# Patient Record
Sex: FEMALE | Race: BLACK OR AFRICAN AMERICAN | Employment: FULL TIME | URBAN - METROPOLITAN AREA
[De-identification: names, ages, dates, MRNs, and addresses within clinical notes are randomized per-mention and may not be internally consistent; named-entity substitution may affect disease eponyms.]

---

## 2024-02-08 ENCOUNTER — OFFICE VISIT (OUTPATIENT)
Age: 79
End: 2024-02-08

## 2024-02-08 VITALS
BODY MASS INDEX: 26.55 KG/M2 | RESPIRATION RATE: 16 BRPM | WEIGHT: 165.2 LBS | SYSTOLIC BLOOD PRESSURE: 168 MMHG | HEART RATE: 68 BPM | DIASTOLIC BLOOD PRESSURE: 84 MMHG | OXYGEN SATURATION: 98 % | HEIGHT: 66 IN | TEMPERATURE: 97.2 F

## 2024-02-08 DIAGNOSIS — R06.02 SHORTNESS OF BREATH: ICD-10-CM

## 2024-02-08 DIAGNOSIS — R55 PRE-SYNCOPE: ICD-10-CM

## 2024-02-08 DIAGNOSIS — I50.22 CHRONIC SYSTOLIC CHF (CONGESTIVE HEART FAILURE) (HCC): ICD-10-CM

## 2024-02-08 DIAGNOSIS — I11.0 HYPERTENSIVE HEART DISEASE WITH CHRONIC SYSTOLIC CONGESTIVE HEART FAILURE (HCC): Primary | ICD-10-CM

## 2024-02-08 DIAGNOSIS — I50.22 HYPERTENSIVE HEART DISEASE WITH CHRONIC SYSTOLIC CONGESTIVE HEART FAILURE (HCC): Primary | ICD-10-CM

## 2024-02-08 RX ORDER — DAPAGLIFLOZIN 10 MG/1
1 TABLET, FILM COATED ORAL
COMMUNITY

## 2024-02-08 RX ORDER — SPIRONOLACTONE 25 MG/1
25 TABLET ORAL DAILY
COMMUNITY
Start: 2023-09-27 | End: 2024-02-08 | Stop reason: DRUGHIGH

## 2024-02-08 RX ORDER — ACETAMINOPHEN 500 MG
1000 TABLET ORAL EVERY 6 HOURS PRN
COMMUNITY

## 2024-02-08 RX ORDER — MECLIZINE HYDROCHLORIDE 25 MG/1
TABLET ORAL
COMMUNITY
Start: 2021-07-08

## 2024-02-08 RX ORDER — NICOTINE 21 MG/24HR
1 PATCH, TRANSDERMAL 24 HOURS TRANSDERMAL EVERY 24 HOURS
COMMUNITY
Start: 2023-09-27

## 2024-02-08 RX ORDER — SPIRONOLACTONE 50 MG/1
50 TABLET, FILM COATED ORAL DAILY
Qty: 30 TABLET | Refills: 3 | Status: SHIPPED | OUTPATIENT
Start: 2024-02-08

## 2024-02-08 RX ORDER — LOSARTAN POTASSIUM 100 MG/1
100 TABLET ORAL DAILY
COMMUNITY
Start: 2023-11-14 | End: 2024-02-08

## 2024-02-08 RX ORDER — ERGOCALCIFEROL 1.25 MG/1
50000 CAPSULE ORAL WEEKLY
COMMUNITY

## 2024-02-08 RX ORDER — CARVEDILOL 25 MG/1
25 TABLET ORAL 2 TIMES DAILY
Qty: 180 TABLET | Refills: 2 | Status: SHIPPED | OUTPATIENT
Start: 2024-02-08

## 2024-02-08 RX ORDER — SACUBITRIL AND VALSARTAN 97; 103 MG/1; MG/1
TABLET, FILM COATED ORAL
COMMUNITY

## 2024-02-08 ASSESSMENT — ENCOUNTER SYMPTOMS
ABDOMINAL PAIN: 0
RHINORRHEA: 0
DIARRHEA: 0
CONSTIPATION: 0
WHEEZING: 0
SORE THROAT: 0
VOMITING: 0
SHORTNESS OF BREATH: 1
COUGH: 0
NAUSEA: 0

## 2024-02-08 NOTE — PATIENT INSTRUCTIONS
Medication Changes:  Start taking aldactone 50mg daily, start carvedilol 25mg BID     Other recommendations:  -Check blood pressures twice daily at home  -Low sodium diet   -Fluid restriction 1.5L per day    Patient Education        DASH Diet: Care Instructions  Your Care Instructions     The DASH diet is an eating plan that can help lower your blood pressure. DASH stands for Dietary Approaches to Stop Hypertension. Hypertension is high blood pressure.  The DASH diet focuses on eating foods that are high in calcium, potassium, and magnesium. These nutrients can lower blood pressure. The foods that are highest in these nutrients are fruits, vegetables, low-fat dairy products, nuts, seeds, and legumes. But taking calcium, potassium, and magnesium supplements instead of eating foods that are high in those nutrients does not have the same effect. The DASH diet also includes whole grains, fish, and poultry.  The DASH diet is one of several lifestyle changes your doctor may recommend to lower your high blood pressure. Your doctor may also want you to decrease the amount of sodium in your diet. Lowering sodium while following the DASH diet can lower blood pressure even further than just the DASH diet alone.  Follow-up care is a key part of your treatment and safety. Be sure to make and go to all appointments, and call your doctor if you are having problems. It's also a good idea to know your test results and keep a list of the medicines you take.  How can you care for yourself at home?  Following the DASH diet  Eat 4 to 5 servings of fruit each day. A serving is 1 medium-sized piece of fruit, 1/2 cup raw or canned fruit, 1/4 cup dried fruit, or 4 ounces (1/2 cup) of fruit juice. Choose fruit more often than fruit juice.  Eat 4 to 5 servings of vegetables each day. A serving is 1 cup of lettuce or raw leafy vegetables, 1/2 cup of chopped or cooked vegetables, or 4 ounces (1/2 cup) of vegetable juice. Choose vegetables more

## 2024-02-08 NOTE — PROGRESS NOTES
Freddy Hong (:  1945) is a 78 y.o. female, with history significant for chronic systolic CHF, CAD, hypertension, hypercholesterolemia, hyperaldosteronism, hypokalemia, COPD, vertigo who presents for Follow-up (C/O: BP issues/)    She was last seen by Dr. Mcclain on 10/5/2023 as a new patient.  At that time, it was noted that she had recently been hospitalized for CHF.  She was started on GDMT at that time and had been doing much better since then.  She was started on farxiga at that time.    Freddy states that she has been having increased shortness of breath over the past few weeks.  She reports she is getting this with minimal exertion.  She has also noticed that her blood pressure has been high with systolics up to the 180s which has been going on for a long time.  This is even after taking her medications, she denies missing any doses.  She states she has been getting occasional headaches as well as dizziness.  Did have 1 episode of presyncope.  She saw her PCP for this recently and he ordered a CTA head and neck.  She reports orthopnea and lower extremity edema.  She denies chest pain, palpitations, abdominal pain, nausea, vomiting, fever/chills, PND.    Relevant results:  Echo 2023 -moderately reduced left ventricular systolic function with LVEF 30%, global hypokinesis of the left ventricle, mild left ventricular enlargement, grade 1 diastolic dysfunction, normal RV chamber, no significant valvular disease    Left heart cath 2023 - mild nonobstructive CAD    Subjective   SUBJECTIVE/OBJECTIVE:  HPI  See above    Past Medical History:   Diagnosis Date    Elevated cholesterol     Esophageal reflux     Hypertension         Past Surgical History:   Procedure Laterality Date    TUBAL LIGATION      UROLOGICAL SURGERY  2023    PERIPHERAL NERVE EVALUATION UNDER FLUOROSCOPY UNDER ANESTHESIA; Dr Cummings    UROLOGICAL SURGERY  2023    COMPLETE INTERSTIM IMPLANTATION AND IMPEDANCE CHECK

## 2024-02-15 ENCOUNTER — OFFICE VISIT (OUTPATIENT)
Age: 79
End: 2024-02-15
Payer: COMMERCIAL

## 2024-02-15 VITALS
OXYGEN SATURATION: 98 % | BODY MASS INDEX: 26.55 KG/M2 | HEIGHT: 66 IN | WEIGHT: 165.2 LBS | DIASTOLIC BLOOD PRESSURE: 92 MMHG | SYSTOLIC BLOOD PRESSURE: 167 MMHG | RESPIRATION RATE: 14 BRPM | TEMPERATURE: 97.4 F | HEART RATE: 58 BPM

## 2024-02-15 DIAGNOSIS — I77.74 VERTEBRAL ARTERY DISSECTION (HCC): ICD-10-CM

## 2024-02-15 DIAGNOSIS — I11.0 HYPERTENSIVE HEART DISEASE WITH CHRONIC SYSTOLIC CONGESTIVE HEART FAILURE (HCC): Primary | ICD-10-CM

## 2024-02-15 DIAGNOSIS — I50.22 CHRONIC SYSTOLIC CHF (CONGESTIVE HEART FAILURE) (HCC): ICD-10-CM

## 2024-02-15 DIAGNOSIS — R06.02 SHORTNESS OF BREATH: ICD-10-CM

## 2024-02-15 DIAGNOSIS — I50.22 HYPERTENSIVE HEART DISEASE WITH CHRONIC SYSTOLIC CONGESTIVE HEART FAILURE (HCC): Primary | ICD-10-CM

## 2024-02-15 PROCEDURE — 3080F DIAST BP >= 90 MM HG: CPT

## 2024-02-15 PROCEDURE — 99214 OFFICE O/P EST MOD 30 MIN: CPT

## 2024-02-15 PROCEDURE — 3077F SYST BP >= 140 MM HG: CPT

## 2024-02-15 PROCEDURE — 1123F ACP DISCUSS/DSCN MKR DOCD: CPT

## 2024-02-15 RX ORDER — HYDRALAZINE HYDROCHLORIDE 10 MG/1
10 TABLET, FILM COATED ORAL 3 TIMES DAILY
Qty: 180 TABLET | Refills: 3 | Status: SHIPPED | OUTPATIENT
Start: 2024-02-15 | End: 2025-02-14

## 2024-02-15 NOTE — PROGRESS NOTES
Freddy Hong (:  1945) is a 78 y.o. female, with history significant for chronic systolic CHF, CAD, hypertension, hypercholesterolemia, hyperaldosteronism, hypokalemia, COPD, vertigo  who presents for follow up of HTN.    She was last seen in the office on 24 for poorly controlled HTN. She had noted systolics up to the 180s at that time.  This was with taking all of her medications.  Her carvedilol and Aldactone doses were increased at that time.  She was asked to check her blood pressures twice daily at home on this new regimen and return in a week for reevaluation. A renal PVL was also ordered at that time to rule out renal artery stenosis.    Freddy states that she was called by her primary care provider this morning with abnormal CTA results.  She states that she was told this morning by him that if she were having headaches that she should go to the emergency department.  She reports that she is having a throbbing sensation in her head.  No dizziness or lightheadedness currently.  She reports her blood pressures have been better since increasing her regimen.  Her systolics at home have been in the 130s to 140s previously were into the 160s to 170s at home.  She denies chest pain, palpitations, abdominal pain, nausea, vomiting, fevers/chills, presyncope or syncope, weakness, visual changes.  She remains with exertional shortness of breath but this has been better since increasing her Aldactone.  Her lower extremity edema has also improved with this.    Relevant results:  Echo 2023 - moderately reduced left ventricular systolic function with LVEF 30%, global hypokinesis of the left ventricle, mild left ventricular enlargement, grade 1 diastolic dysfunction, normal RV chamber, no significant valvular disease     Left heart cath 2023 - mild nonobstructive CAD    Subjective   SUBJECTIVE/OBJECTIVE:  HPI  See above    Past Medical History:   Diagnosis Date    Elevated cholesterol

## 2024-02-27 RX ORDER — DAPAGLIFLOZIN 10 MG/1
10 TABLET, FILM COATED ORAL DAILY
Qty: 90 TABLET | Refills: 3 | Status: SHIPPED | OUTPATIENT
Start: 2024-02-27

## 2024-02-27 NOTE — TELEPHONE ENCOUNTER
PCP: Fabrice Malagon MD    Last appt:  2/8/2024 &2/15/2024  Future Appointments   Date Time Provider Department Center   5/16/2024  1:45 PM Devon Mcclain Sr., MD [facility]       Requested Prescriptions     Pending Prescriptions Disp Refills    FARXIGA 10 MG tablet 90 tablet 3     Sig: Take 1 tablet by mouth daily       Request for a 30 or 90 day supply? Provider Discretion    Pharmacy: confirmed    Other Comments: n/a

## 2024-03-01 RX ORDER — DAPAGLIFLOZIN 10 MG/1
10 TABLET, FILM COATED ORAL DAILY
Qty: 90 TABLET | Refills: 3 | Status: CANCELLED | OUTPATIENT
Start: 2024-03-01

## 2024-03-01 NOTE — TELEPHONE ENCOUNTER
PCP: Fabrice Malagon MD    Last appt:  Visit date not found   Future Appointments   Date Time Provider Department Center   5/16/2024  1:45 PM Devon Mcclain Sr., MD [facility]       Requested Prescriptions      No prescriptions requested or ordered in this encounter       Request for a 30 or 90 day supply? Provider Discretion    Pharmacy: Confirmed    Other Comments: N/A

## 2024-03-14 ENCOUNTER — OFFICE VISIT (OUTPATIENT)
Age: 79
End: 2024-03-14
Payer: COMMERCIAL

## 2024-03-14 VITALS
HEIGHT: 66 IN | TEMPERATURE: 97.5 F | DIASTOLIC BLOOD PRESSURE: 89 MMHG | RESPIRATION RATE: 16 BRPM | WEIGHT: 158 LBS | HEART RATE: 54 BPM | SYSTOLIC BLOOD PRESSURE: 152 MMHG | BODY MASS INDEX: 25.39 KG/M2

## 2024-03-14 DIAGNOSIS — R06.02 EXERTIONAL SHORTNESS OF BREATH: Primary | ICD-10-CM

## 2024-03-14 DIAGNOSIS — I42.0 CONGESTIVE CARDIOMYOPATHY (HCC): ICD-10-CM

## 2024-03-14 DIAGNOSIS — R01.1 SYSTOLIC MURMUR: ICD-10-CM

## 2024-03-14 DIAGNOSIS — E78.00 HYPERCHOLESTEREMIA: ICD-10-CM

## 2024-03-14 DIAGNOSIS — I10 PRIMARY HYPERTENSION: ICD-10-CM

## 2024-03-14 DIAGNOSIS — I50.22 CHRONIC SYSTOLIC CHF (CONGESTIVE HEART FAILURE) (HCC): ICD-10-CM

## 2024-03-14 PROBLEM — K57.92 DIVERTICULITIS: Status: ACTIVE | Noted: 2024-03-14

## 2024-03-14 PROBLEM — R19.5 FECES CONTENTS ABNORMAL: Status: ACTIVE | Noted: 2024-03-14

## 2024-03-14 PROBLEM — M89.9 DISORDER OF BONE AND ARTICULAR CARTILAGE: Status: ACTIVE | Noted: 2024-03-14

## 2024-03-14 PROBLEM — N94.9 FEMALE GENITAL SYMPTOMS: Status: ACTIVE | Noted: 2024-03-14

## 2024-03-14 PROBLEM — M94.9 DISORDER OF BONE AND ARTICULAR CARTILAGE: Status: ACTIVE | Noted: 2024-03-14

## 2024-03-14 PROBLEM — E53.8 VITAMIN B12 DEFICIENCY (NON ANEMIC): Status: ACTIVE | Noted: 2024-03-14

## 2024-03-14 PROBLEM — I51.89 DIASTOLIC DYSFUNCTION: Chronic | Status: ACTIVE | Noted: 2020-02-24

## 2024-03-14 PROBLEM — R94.31 INVERTED T WAVE: Chronic | Status: ACTIVE | Noted: 2020-02-23

## 2024-03-14 PROBLEM — J44.9 CHRONIC OBSTRUCTIVE PULMONARY DISEASE (HCC): Status: ACTIVE | Noted: 2024-03-14

## 2024-03-14 PROBLEM — R94.39 ABNORMAL STRESS TEST: Status: ACTIVE | Noted: 2020-02-23

## 2024-03-14 PROBLEM — R61 DIAPHORESIS: Status: ACTIVE | Noted: 2020-02-23

## 2024-03-14 PROBLEM — K21.9 ESOPHAGEAL REFLUX: Status: ACTIVE | Noted: 2019-03-06

## 2024-03-14 PROBLEM — R42 DIZZINESS: Status: ACTIVE | Noted: 2024-02-15

## 2024-03-14 PROBLEM — N63.0 BREAST LUMP: Status: ACTIVE | Noted: 2024-03-14

## 2024-03-14 PROBLEM — H53.9 VISUAL DISTURBANCE: Status: ACTIVE | Noted: 2024-03-14

## 2024-03-14 PROBLEM — I63.9 STROKE (CEREBRUM) (HCC): Status: ACTIVE | Noted: 2019-03-06

## 2024-03-14 PROBLEM — R71.8 MICROCYTOSIS: Chronic | Status: ACTIVE | Noted: 2020-02-23

## 2024-03-14 PROBLEM — R10.9 ABDOMINAL PAIN: Status: ACTIVE | Noted: 2024-03-14

## 2024-03-14 PROBLEM — I63.9 ACUTE CVA (CEREBROVASCULAR ACCIDENT) (HCC): Status: ACTIVE | Noted: 2019-03-06

## 2024-03-14 PROBLEM — E55.9 VITAMIN D DEFICIENCY: Status: ACTIVE | Noted: 2024-03-14

## 2024-03-14 PROBLEM — N17.9 AKI (ACUTE KIDNEY INJURY) (HCC): Status: ACTIVE | Noted: 2020-02-23

## 2024-03-14 PROBLEM — N88.2 STENOSIS OF CERVIX: Status: ACTIVE | Noted: 2024-03-14

## 2024-03-14 PROBLEM — R06.09 EXERTIONAL DYSPNEA: Status: ACTIVE | Noted: 2020-02-23

## 2024-03-14 PROBLEM — U07.1 COVID-19: Status: ACTIVE | Noted: 2024-03-14

## 2024-03-14 PROBLEM — I20.89 ANGINAL EQUIVALENT: Status: ACTIVE | Noted: 2020-02-23

## 2024-03-14 PROBLEM — N95.9 MENOPAUSAL AND POSTMENOPAUSAL DISORDER: Status: ACTIVE | Noted: 2024-03-14

## 2024-03-14 PROBLEM — N91.2 AMENORRHEA: Status: ACTIVE | Noted: 2024-03-14

## 2024-03-14 PROBLEM — R68.84 JAW PAIN, NON-TMJ: Status: ACTIVE | Noted: 2020-02-23

## 2024-03-14 PROCEDURE — 3077F SYST BP >= 140 MM HG: CPT | Performed by: INTERNAL MEDICINE

## 2024-03-14 PROCEDURE — 99215 OFFICE O/P EST HI 40 MIN: CPT | Performed by: INTERNAL MEDICINE

## 2024-03-14 PROCEDURE — 1123F ACP DISCUSS/DSCN MKR DOCD: CPT | Performed by: INTERNAL MEDICINE

## 2024-03-14 PROCEDURE — 3078F DIAST BP <80 MM HG: CPT | Performed by: INTERNAL MEDICINE

## 2024-03-14 RX ORDER — PRAVASTATIN SODIUM 20 MG
20 TABLET ORAL DAILY
COMMUNITY
Start: 2024-03-13

## 2024-03-14 RX ORDER — CLOPIDOGREL BISULFATE 75 MG/1
75 TABLET ORAL
COMMUNITY

## 2024-03-14 RX ORDER — NIFEDIPINE 30 MG/1
30 TABLET, EXTENDED RELEASE ORAL DAILY
Qty: 30 TABLET | Refills: 5 | Status: SHIPPED | OUTPATIENT
Start: 2024-03-14

## 2024-03-14 RX ORDER — DOXYCYCLINE HYCLATE 100 MG/1
100 CAPSULE ORAL 2 TIMES DAILY
COMMUNITY
Start: 2024-03-07

## 2024-03-14 ASSESSMENT — ENCOUNTER SYMPTOMS
GASTROINTESTINAL NEGATIVE: 1
EYES NEGATIVE: 1
SHORTNESS OF BREATH: 1
ALLERGIC/IMMUNOLOGIC NEGATIVE: 1

## 2024-03-14 NOTE — PROGRESS NOTES
Freddy Hong (:  1945) is a 79 y.o. female,Established patient, here for evaluation of the following chief complaint(s):  Follow-Up from Hospital    Subjective   SUBJECTIVE/OBJECTIVE:  HPI  Patient presents today as a follow up patient evaluation.  She has a history of congestive heart failure noted more recently. Patient woke up with acute shortness of breath that led her to a hospital stay. She went to [facility] where she was admitted and was found to be in heart failure. Patient underwent a catheterization showing no evidence of active coronary disease. Patient was placed on Entresto, spironolactone, Lasix, and carvedilol. Since that time, she has done better, but still has intermittent episodes of shortness of breath and elevated blood pressure leading to recurrent hospital stays.  She was recently discharged from another decompensated heart failure event.  Blood pressures are poorly controlled today.  No other acute issues at this time.         I have carefully reviewed all available medical records, previous office notes, labs, x-rays, and procedure reports.    Past Medical History:   Diagnosis Date    Elevated cholesterol     Esophageal reflux     Hypertension         Past Surgical History:   Procedure Laterality Date    TUBAL LIGATION      UROLOGICAL SURGERY  2023    PERIPHERAL NERVE EVALUATION UNDER FLUOROSCOPY UNDER ANESTHESIA; Dr Cummings    UROLOGICAL SURGERY  2023    COMPLETE INTERSTIM IMPLANTATION AND IMPEDANCE CHECK under fluoroscopy with interpretation; Dr Cummings        Allergies   Allergen Reactions    Atorvastatin Nausea And Vomiting    Sulfa Antibiotics Swelling, Other (See Comments) and Nausea And Vomiting     Other reaction(s): Unknown (comments)        Current Outpatient Medications   Medication Sig Dispense Refill    CALCIUM CITRATE PO Take by mouth daily      UNABLE TO FIND daily Beets gummies      pravastatin (PRAVACHOL) 20 MG tablet Take 1 tablet

## 2024-03-14 NOTE — PROGRESS NOTES
Per Dr. Devon Mcclain, gave the pt samples of Farxiga 10 mg (1) tab once a day, to help with cost. Gave the pt an [place] for the [place] for Farxiga, asked her to fill it out and submit it asap. Freddy whitfield voiced understanding.    Farxiga NDC 6690-3881-81 Lot # [id number] EXP: 8/2026 3 packs (21) pills

## 2024-03-29 ENCOUNTER — TELEPHONE (OUTPATIENT)
Age: 79
End: 2024-03-29

## 2024-03-29 NOTE — TELEPHONE ENCOUNTER
[facility] called and stated that the patient wanted to resume care with them.   She needs a cardiac clearance sent stating that can do Cardiac Rehab.

## 2024-04-18 ENCOUNTER — TELEPHONE (OUTPATIENT)
Age: 79
End: 2024-04-18

## 2024-04-18 NOTE — TELEPHONE ENCOUNTER
Patient came into the office stating that the cardiac rehab center needs Dr. Mcclain to write a clearance letter or call the facility at [phone] stating that it is okay for her to start doing cardiac rehab again.

## 2024-06-01 RX ORDER — SPIRONOLACTONE 50 MG/1
50 TABLET, FILM COATED ORAL DAILY
Qty: 30 TABLET | Refills: 11 | Status: SHIPPED | OUTPATIENT
Start: 2024-06-01

## 2024-08-28 NOTE — TELEPHONE ENCOUNTER
PCP: Fabrice Malagon MD    Last appt:  3/14/2024   Future Appointments   Date Time Provider Department Center   10/3/2024 10:30 AM [facility]   10/14/2024  3:45 PM Devon Mcclain Sr., MD [facility]       Requested Prescriptions     Pending Prescriptions Disp Refills    NIFEdipine (PROCARDIA XL) 30 MG extended release tablet [Pharmacy Med Name: NIFEdipine ER Osmotic Release 30 MG Oral Tablet Extended Release 24 Hour] 30 tablet 0     Sig: Take 1 tablet by mouth once daily       Request for a 30 or 90 day supply? Provider Discretion    Pharmacy: confirm    Other Comments: n/a

## 2024-08-31 RX ORDER — NIFEDIPINE 30 MG/1
30 TABLET, EXTENDED RELEASE ORAL DAILY
Qty: 30 TABLET | Refills: 11 | Status: SHIPPED | OUTPATIENT
Start: 2024-08-31

## 2024-09-25 DIAGNOSIS — R06.02 SHORTNESS OF BREATH: Primary | ICD-10-CM

## 2024-10-14 ENCOUNTER — OFFICE VISIT (OUTPATIENT)
Age: 79
End: 2024-10-14
Payer: MEDICARE

## 2024-10-14 VITALS
BODY MASS INDEX: 26.23 KG/M2 | TEMPERATURE: 96.9 F | HEIGHT: 66 IN | WEIGHT: 163.2 LBS | SYSTOLIC BLOOD PRESSURE: 128 MMHG | OXYGEN SATURATION: 96 % | DIASTOLIC BLOOD PRESSURE: 69 MMHG | HEART RATE: 58 BPM

## 2024-10-14 DIAGNOSIS — I42.0 CONGESTIVE CARDIOMYOPATHY (HCC): ICD-10-CM

## 2024-10-14 DIAGNOSIS — E78.00 HYPERCHOLESTEREMIA: ICD-10-CM

## 2024-10-14 DIAGNOSIS — R06.02 EXERTIONAL SHORTNESS OF BREATH: Primary | ICD-10-CM

## 2024-10-14 DIAGNOSIS — I10 PRIMARY HYPERTENSION: ICD-10-CM

## 2024-10-14 DIAGNOSIS — I71.41 PARARENAL ABDOMINAL AORTIC ANEURYSM (AAA) WITHOUT RUPTURE (HCC): ICD-10-CM

## 2024-10-14 DIAGNOSIS — I50.22 CHRONIC SYSTOLIC CHF (CONGESTIVE HEART FAILURE) (HCC): ICD-10-CM

## 2024-10-14 DIAGNOSIS — R01.1 SYSTOLIC MURMUR: ICD-10-CM

## 2024-10-14 DIAGNOSIS — I77.74 VERTEBRAL ARTERY DISSECTION (HCC): ICD-10-CM

## 2024-10-14 PROCEDURE — 99214 OFFICE O/P EST MOD 30 MIN: CPT | Performed by: INTERNAL MEDICINE

## 2024-10-14 PROCEDURE — 1123F ACP DISCUSS/DSCN MKR DOCD: CPT | Performed by: INTERNAL MEDICINE

## 2024-10-14 PROCEDURE — 3074F SYST BP LT 130 MM HG: CPT | Performed by: INTERNAL MEDICINE

## 2024-10-14 PROCEDURE — 3078F DIAST BP <80 MM HG: CPT | Performed by: INTERNAL MEDICINE

## 2024-10-14 RX ORDER — DAPAGLIFLOZIN 10 MG/1
10 TABLET, FILM COATED ORAL DAILY
Qty: 28 TABLET | Refills: 0 | COMMUNITY
Start: 2024-10-14

## 2024-10-14 ASSESSMENT — PATIENT HEALTH QUESTIONNAIRE - PHQ9
SUM OF ALL RESPONSES TO PHQ QUESTIONS 1-9: 0
SUM OF ALL RESPONSES TO PHQ QUESTIONS 1-9: 0
2. FEELING DOWN, DEPRESSED OR HOPELESS: NOT AT ALL
1. LITTLE INTEREST OR PLEASURE IN DOING THINGS: NOT AT ALL
SUM OF ALL RESPONSES TO PHQ QUESTIONS 1-9: 0
SUM OF ALL RESPONSES TO PHQ QUESTIONS 1-9: 0
SUM OF ALL RESPONSES TO PHQ9 QUESTIONS 1 & 2: 0

## 2024-10-14 ASSESSMENT — ENCOUNTER SYMPTOMS
ALLERGIC/IMMUNOLOGIC NEGATIVE: 1
EYES NEGATIVE: 1
SHORTNESS OF BREATH: 1
GASTROINTESTINAL NEGATIVE: 1

## 2024-10-14 NOTE — PROGRESS NOTES
1. \"Have you been to the ER, urgent care clinic since your last visit?  Hospitalized since your last visit?\" Reviewed by Dr. Devon Mcclain    2. \"Have you seen or consulted any other health care providers outside of the [facility] since your last visit?\" Reviewed by Dr. Devon Mcclain

## 2024-10-14 NOTE — PROGRESS NOTES
Freddy Hong (:  1945) is a 79 y.o. female,Established patient, here for evaluation of the following chief complaint(s):  Follow-up (7month)    Subjective   SUBJECTIVE/OBJECTIVE:  HPI  Patient presents today as a follow up patient evaluation.  She has a history of congestive heart failure noted more recently. Patient woke up with acute shortness of breath that led her to a hospital stay. She went to [facility] where she was admitted and was found to be in heart failure. Patient underwent a catheterization showing no evidence of active coronary disease. Patient was placed on Entresto, farxiga, spironolactone, Lasix, and carvedilol. Since that time, she has done better, but still has intermittent episodes of shortness of breath but minimal.  She has not been hospitalized for CHF for several months.  Blood pressures are better controlled today.  No other acute issues at this time.         I have carefully reviewed all available medical records, previous office notes, labs, x-rays, and procedure reports.    Past Medical History:   Diagnosis Date    Elevated cholesterol     Esophageal reflux     Hypertension         Past Surgical History:   Procedure Laterality Date    TUBAL LIGATION      UROLOGICAL SURGERY  2023    PERIPHERAL NERVE EVALUATION UNDER FLUOROSCOPY UNDER ANESTHESIA; Dr Cummings    UROLOGICAL SURGERY  2023    COMPLETE INTERSTIM IMPLANTATION AND IMPEDANCE CHECK under fluoroscopy with interpretation; Dr Cummings        Allergies   Allergen Reactions    Atorvastatin Nausea And Vomiting    Sulfa Antibiotics Swelling, Other (See Comments) and Nausea And Vomiting     Other reaction(s): Unknown (comments)        Current Outpatient Medications   Medication Sig Dispense Refill    FARXIGA 10 MG tablet Take 1 tablet by mouth daily 28 tablet 0    NIFEdipine (PROCARDIA XL) 30 MG extended release tablet Take 1 tablet by mouth once daily 30 tablet 11    spironolactone (ALDACTONE) 50

## 2024-11-01 RX ORDER — HYDRALAZINE HYDROCHLORIDE 10 MG/1
10 TABLET, FILM COATED ORAL 3 TIMES DAILY
Qty: 180 TABLET | Refills: 3 | Status: SHIPPED | OUTPATIENT
Start: 2024-11-01

## 2024-11-01 RX ORDER — CARVEDILOL 25 MG/1
25 TABLET ORAL 2 TIMES DAILY
Qty: 180 TABLET | Refills: 3 | Status: SHIPPED | OUTPATIENT
Start: 2024-11-01

## 2024-11-13 ENCOUNTER — TELEPHONE (OUTPATIENT)
Age: 79
End: 2024-11-13

## 2024-11-13 NOTE — TELEPHONE ENCOUNTER
PCP: Fabrice Malagon MD    Last appt:  10/14/2024   Future Appointments   Date Time Provider Department Center   5/8/2025  3:15 PM Devon Mcclain Sr., MD [facility]       Requested Prescriptions     Pending Prescriptions Disp Refills    spironolactone (ALDACTONE) 50 MG tablet 90 tablet 3     Sig: Take 1 tablet by mouth daily       Request for a 30 or 90 day supply? Provider Discretion    Pharmacy: CONFIRMED    Other Comments: N/A

## 2024-11-18 RX ORDER — SPIRONOLACTONE 50 MG/1
50 TABLET, FILM COATED ORAL DAILY
Qty: 90 TABLET | Refills: 3 | Status: SHIPPED | OUTPATIENT
Start: 2024-11-18

## 2025-01-14 ENCOUNTER — TELEPHONE (OUTPATIENT)
Age: 80
End: 2025-01-14

## 2025-01-14 NOTE — TELEPHONE ENCOUNTER
PCP: Fabrice Malagon MD    Last appt:  10/14/2024   Future Appointments   Date Time Provider Department Center   5/8/2025  3:15 PM Devon Mcclain Sr., MD [facility]       Requested Prescriptions     Pending Prescriptions Disp Refills    FARXIGA 10 MG tablet 90 tablet 2     Sig: Take 1 tablet by mouth daily       Request for a 30 or 90 day supply? Provider Discretion    Pharmacy: Confirmed     Other Comments: N/A

## 2025-01-14 NOTE — TELEPHONE ENCOUNTER
Patient called and left a voice message on the nurse line stating that she could not afford to get her Farxiga and Entresto.    I called the patient back, no answer, but I did ask her to return the call to the office for more information.     When patient returns call, provide her with this information.:    -simple fill    [phone]  -she can [place] assistance through [place]  -She can also apply for assistance through the [employer] patient assistance program.

## 2025-01-17 ENCOUNTER — TELEPHONE (OUTPATIENT)
Age: 80
End: 2025-01-17

## 2025-01-17 RX ORDER — DAPAGLIFLOZIN 10 MG/1
10 TABLET, FILM COATED ORAL DAILY
Qty: 28 TABLET | Refills: 0 | Status: SHIPPED | OUTPATIENT
Start: 2025-01-17

## 2025-01-17 NOTE — TELEPHONE ENCOUNTER
Received a message on the nurse line this morning. Confirmed that this was the patient by the phone number that she was calling from. She did not leave her name nor her  on the answering machine.,  She was upset and very rude on the message. She stated that she did not want any information about help getting her medication, she is only interested in getting samples, and she is very adamit about it.  At the time of her first call we did not have samples.      Obtained samples and called the patient to let her know that she could  samples. Patient verbalized understanding.

## 2025-01-17 NOTE — TELEPHONE ENCOUNTER
Spoke with Freddy Hong, two pt identifiers verified, name and . Patient was in office today to  sample medications-Entresto and Farxiga. Patient was verbally given directions how to take medication. Freddy Hong verbally understood.

## 2025-01-22 RX ORDER — DAPAGLIFLOZIN 10 MG/1
10 TABLET, FILM COATED ORAL DAILY
Qty: 90 TABLET | Refills: 2 | Status: SHIPPED | OUTPATIENT
Start: 2025-01-22

## 2025-04-23 ENCOUNTER — TELEPHONE (OUTPATIENT)
Age: 80
End: 2025-04-23

## 2025-04-23 NOTE — TELEPHONE ENCOUNTER
Patient called and left a voice message on the nurse line stating that she is having problems with swelling to her ankles.   Reviewed her meds and she also told me that she is taking Lasix 20 mg daily. She got this from her primary provider. I asked her about her BP readings and these are what she gave me.    Today 120/80  She also told me that her BP are running good.              170/80              180/70             129/94  These were all taken mid day.    She said she is on a 1500 cc fluid limit/  Asked about her diet and she said that she doesn't eat much at all. Then found out she had sausage for breakfast.     Her follow up appt is on May 8th. Approx. 2 weeks

## 2025-05-08 ENCOUNTER — OFFICE VISIT (OUTPATIENT)
Age: 80
End: 2025-05-08
Payer: MEDICARE

## 2025-05-08 VITALS
HEIGHT: 66 IN | WEIGHT: 169 LBS | DIASTOLIC BLOOD PRESSURE: 68 MMHG | OXYGEN SATURATION: 93 % | HEART RATE: 74 BPM | BODY MASS INDEX: 27.16 KG/M2 | SYSTOLIC BLOOD PRESSURE: 124 MMHG

## 2025-05-08 DIAGNOSIS — R06.02 EXERTIONAL SHORTNESS OF BREATH: Primary | ICD-10-CM

## 2025-05-08 DIAGNOSIS — I71.41 PARARENAL ABDOMINAL AORTIC ANEURYSM (AAA) WITHOUT RUPTURE: ICD-10-CM

## 2025-05-08 DIAGNOSIS — I50.42 CHRONIC COMBINED SYSTOLIC AND DIASTOLIC CHF (CONGESTIVE HEART FAILURE) (HCC): ICD-10-CM

## 2025-05-08 DIAGNOSIS — I77.74 VERTEBRAL ARTERY DISSECTION: ICD-10-CM

## 2025-05-08 DIAGNOSIS — I50.22 HYPERTENSIVE HEART DISEASE WITH CHRONIC SYSTOLIC CONGESTIVE HEART FAILURE (HCC): ICD-10-CM

## 2025-05-08 DIAGNOSIS — E78.00 HYPERCHOLESTEREMIA: ICD-10-CM

## 2025-05-08 DIAGNOSIS — I10 PRIMARY HYPERTENSION: ICD-10-CM

## 2025-05-08 DIAGNOSIS — I42.0 CONGESTIVE CARDIOMYOPATHY (HCC): ICD-10-CM

## 2025-05-08 DIAGNOSIS — R01.1 SYSTOLIC MURMUR: ICD-10-CM

## 2025-05-08 DIAGNOSIS — I11.0 HYPERTENSIVE HEART DISEASE WITH CHRONIC SYSTOLIC CONGESTIVE HEART FAILURE (HCC): ICD-10-CM

## 2025-05-08 PROCEDURE — 99215 OFFICE O/P EST HI 40 MIN: CPT | Performed by: INTERNAL MEDICINE

## 2025-05-08 PROCEDURE — 3078F DIAST BP <80 MM HG: CPT | Performed by: INTERNAL MEDICINE

## 2025-05-08 PROCEDURE — 1123F ACP DISCUSS/DSCN MKR DOCD: CPT | Performed by: INTERNAL MEDICINE

## 2025-05-08 PROCEDURE — 3074F SYST BP LT 130 MM HG: CPT | Performed by: INTERNAL MEDICINE

## 2025-05-08 ASSESSMENT — ENCOUNTER SYMPTOMS
EYES NEGATIVE: 1
ALLERGIC/IMMUNOLOGIC NEGATIVE: 1
SHORTNESS OF BREATH: 1
GASTROINTESTINAL NEGATIVE: 1

## 2025-05-08 NOTE — PROGRESS NOTES
1. \"Have you been to the ER, urgent care clinic since your last visit?  Hospitalized since your last visit?\" Reviewed by Dr. Devon Mcclain    2. \"Have you seen or consulted any other health care providers outside of the [facility] since your last visit?\" Reviewed by Dr. Devon Mcclain

## 2025-05-08 NOTE — PROGRESS NOTES
Freddy Hong (:  1945) is a 80 y.o. female,Established patient, here for evaluation of the following chief complaint(s):  Follow-up (7 months f/u/)      Subjective   SUBJECTIVE/OBJECTIVE:  History of Present Illness  Patient presents today as a follow up patient evaluation.  She has a history of congestive heart failure noted more recently. Patient woke up with acute shortness of breath that led her to a hospital stay. She went to [facility] where she was admitted and was found to be in heart failure. Patient underwent a catheterization showing no evidence of active coronary disease. Patient was placed on Entresto, farxiga, spironolactone, Lasix, and carvedilol.  She also has a history of abdominal aortic aneurysm.    She reports persistent ankle swelling, which she describes as fluid accumulation that descends into her ankles upon standing. This condition has been ongoing for several years, and previous evaluations at [facility] did not reveal any abnormalities. She is currently on Lasix and maintains a weight of 164.2 pounds. She also mentions that her legs feel increasingly tight while sitting with her legs dangling.    She is currently on Farxiga for her heart condition and reports no history of diabetes. Her respiratory function is satisfactory. She recalls a previous consultation where her heart function was reported to be at 48 percent, an improvement from a prior reading of 30 percent. She attends cardiac therapy sessions twice weekly and engages in physical activities such as gym workouts, cycling, and treadmill exercises. She is on aspirin 81 mg.    She has been diagnosed with two small aneurysms and is under the care of Dr. Fonseca for this condition.    SOCIAL HISTORY  Exercise: Cardiac therapy twice a week, including activities such as biking, treadmill, and using various machines.  Tobacco: No smoking, has not smoked for 4 years.    I have carefully reviewed all

## 2025-06-16 ENCOUNTER — TELEPHONE (OUTPATIENT)
Age: 80
End: 2025-06-16

## 2025-06-16 NOTE — TELEPHONE ENCOUNTER
Patient called and identified herself by full name and . She is complaining about edema to lower extremities when she has done in the past and at her office visits.  I reviewed the last note written by Dr. Mcclain. It states that she can stop the Nifedipine for 3 days and then call the office and let the staff know if there was any improvement in the swelling. Please review with Dr. Mcclain.

## 2025-07-07 RX ORDER — HYDRALAZINE HYDROCHLORIDE 10 MG/1
10 TABLET, FILM COATED ORAL 3 TIMES DAILY
Qty: 180 TABLET | Refills: 3 | Status: SHIPPED | OUTPATIENT
Start: 2025-07-07

## 2025-08-27 RX ORDER — NIFEDIPINE 30 MG
TABLET, EXTENDED RELEASE ORAL
Qty: 90 TABLET | Refills: 3 | Status: SHIPPED | OUTPATIENT
Start: 2025-08-27

## 2025-08-27 RX ORDER — CARVEDILOL 25 MG/1
TABLET ORAL
Qty: 180 TABLET | Refills: 3 | Status: SHIPPED | OUTPATIENT
Start: 2025-08-27

## 2025-08-27 RX ORDER — DAPAGLIFLOZIN 10 MG/1
TABLET, FILM COATED ORAL
Qty: 90 TABLET | Refills: 3 | Status: SHIPPED | OUTPATIENT
Start: 2025-08-27